# Patient Record
Sex: FEMALE | Race: WHITE
[De-identification: names, ages, dates, MRNs, and addresses within clinical notes are randomized per-mention and may not be internally consistent; named-entity substitution may affect disease eponyms.]

---

## 2021-10-12 ENCOUNTER — HOSPITAL ENCOUNTER (EMERGENCY)
Dept: HOSPITAL 63 - ER | Age: 31
Discharge: HOME | End: 2021-10-12
Payer: COMMERCIAL

## 2021-10-12 VITALS — SYSTOLIC BLOOD PRESSURE: 119 MMHG | DIASTOLIC BLOOD PRESSURE: 76 MMHG

## 2021-10-12 VITALS — HEIGHT: 62 IN | BODY MASS INDEX: 39.64 KG/M2 | WEIGHT: 215.39 LBS

## 2021-10-12 DIAGNOSIS — N30.01: Primary | ICD-10-CM

## 2021-10-12 DIAGNOSIS — Z88.1: ICD-10-CM

## 2021-10-12 LAB
ALBUMIN SERPL-MCNC: 3.3 G/DL (ref 3.4–5)
ALBUMIN/GLOB SERPL: 0.7 {RATIO} (ref 1–1.7)
ALP SERPL-CCNC: 115 U/L (ref 46–116)
ALT SERPL-CCNC: 37 U/L (ref 14–59)
ANION GAP SERPL CALC-SCNC: 7 MMOL/L (ref 6–14)
APTT PPP: (no result) S
AST SERPL-CCNC: 20 U/L (ref 15–37)
BACTERIA #/AREA URNS HPF: (no result) /HPF
BASOPHILS # BLD AUTO: 0.1 X10^3/UL (ref 0–0.2)
BASOPHILS NFR BLD: 1 % (ref 0–3)
BILIRUB SERPL-MCNC: 0.3 MG/DL (ref 0.2–1)
BILIRUB UR QL STRIP: (no result)
BUN/CREAT SERPL: 23 (ref 6–20)
CA-I SERPL ISE-MCNC: 14 MG/DL (ref 7–20)
CALCIUM SERPL-MCNC: 9.6 MG/DL (ref 8.5–10.1)
CHLORIDE SERPL-SCNC: 102 MMOL/L (ref 98–107)
CO2 SERPL-SCNC: 29 MMOL/L (ref 21–32)
CREAT SERPL-MCNC: 0.6 MG/DL (ref 0.6–1)
EOSINOPHIL NFR BLD: 0.1 X10^3/UL (ref 0–0.7)
EOSINOPHIL NFR BLD: 1 % (ref 0–3)
ERYTHROCYTE [DISTWIDTH] IN BLOOD BY AUTOMATED COUNT: 14.3 % (ref 11.5–14.5)
FIBRINOGEN PPP-MCNC: (no result) MG/DL
GFR SERPLBLD BASED ON 1.73 SQ M-ARVRAT: 115.9 ML/MIN
GLOBULIN SER-MCNC: 4.5 G/DL (ref 2.2–3.8)
GLUCOSE SERPL-MCNC: 94 MG/DL (ref 70–99)
GLUCOSE UR STRIP-MCNC: (no result) MG/DL
HCT VFR BLD CALC: 37.4 % (ref 36–47)
HGB BLD-MCNC: 12.2 G/DL (ref 12–15.5)
LYMPHOCYTES # BLD: 3 X10^3/UL (ref 1–4.8)
LYMPHOCYTES NFR BLD AUTO: 29 % (ref 24–48)
MCH RBC QN AUTO: 27 PG (ref 25–35)
MCHC RBC AUTO-ENTMCNC: 33 G/DL (ref 31–37)
MCV RBC AUTO: 81 FL (ref 79–100)
MONO #: 0.7 X10^3/UL (ref 0–1.1)
MONOCYTES NFR BLD: 6 % (ref 0–9)
NEUT #: 6.6 X10^3UL (ref 1.8–7.7)
NEUTROPHILS NFR BLD AUTO: 63 % (ref 31–73)
NITRITE UR QL STRIP: (no result)
PLATELET # BLD AUTO: 336 X10^3/UL (ref 140–400)
POTASSIUM SERPL-SCNC: 3.8 MMOL/L (ref 3.5–5.1)
PROT SERPL-MCNC: 7.8 G/DL (ref 6.4–8.2)
RBC # BLD AUTO: 4.6 X10^6/UL (ref 3.5–5.4)
RBC #/AREA URNS HPF: (no result) /HPF (ref 0–2)
SODIUM SERPL-SCNC: 138 MMOL/L (ref 136–145)
SP GR UR STRIP: >=1.03
SQUAMOUS #/AREA URNS LPF: (no result) /LPF
UROBILINOGEN UR-MCNC: 0.2 MG/DL
WBC # BLD AUTO: 10.4 X10^3/UL (ref 4–11)
WBC #/AREA URNS HPF: (no result) /HPF (ref 0–4)

## 2021-10-12 PROCEDURE — 93005 ELECTROCARDIOGRAM TRACING: CPT

## 2021-10-12 PROCEDURE — 84484 ASSAY OF TROPONIN QUANT: CPT

## 2021-10-12 PROCEDURE — 74177 CT ABD & PELVIS W/CONTRAST: CPT

## 2021-10-12 PROCEDURE — 81001 URINALYSIS AUTO W/SCOPE: CPT

## 2021-10-12 PROCEDURE — 87086 URINE CULTURE/COLONY COUNT: CPT

## 2021-10-12 PROCEDURE — 85379 FIBRIN DEGRADATION QUANT: CPT

## 2021-10-12 PROCEDURE — 85025 COMPLETE CBC W/AUTO DIFF WBC: CPT

## 2021-10-12 PROCEDURE — 80061 LIPID PANEL: CPT

## 2021-10-12 PROCEDURE — 99285 EMERGENCY DEPT VISIT HI MDM: CPT

## 2021-10-12 PROCEDURE — 80053 COMPREHEN METABOLIC PANEL: CPT

## 2021-10-12 PROCEDURE — 81025 URINE PREGNANCY TEST: CPT

## 2021-10-12 PROCEDURE — 36415 COLL VENOUS BLD VENIPUNCTURE: CPT

## 2021-10-12 NOTE — RAD
CT scan of the abdomen and pelvis with contrast  10/12/2021



CLINICAL HISTORY: Right-sided abdominal pain.



TECHNIQUE: After the intravenous administration of 75 cc of Omnipaque 300 only, contiguous, 5 mm axia
l sections were obtained through the abdomen and pelvis.



One or more of the following individualized dose reduction techniques were utilized for this study:



1. Automated exposure control.

2. Adjustment of the mA and/or kV according to patient size.

3. Use of iterative reconstruction technique.



FINDINGS: Images through the lung bases demonstrate a small area of probable subsegmental atelectasis
 involving the medial aspect of the right lower lobe.



The liver parenchyma has a decreased attenuation consistent with fatty infiltration. The spleen, panc
reas, adrenal glands and kidneys are within normal limits.



The abdominal aorta tapers normally. The gallbladder is contracted. No free fluid or free air is seen
 within the abdomen. Air and stool are seen throughout the colon. There is no evidence of bowel obstr
uction. The appendix is well-visualized and is within normal limits. There is a small fat-containing 
umbilical hernia which measures 1.8 cm in size.



Images through the pelvis demonstrate the urinary bladder distended with urine. Scattered diverticula
 are seen involving the sigmoid colon. No inflammatory changes are seen within the adjacent fat. A pu
nctate calcification is seen within the left pelvis consistent with a phlebolith. No free fluid is se
en. No adnexal mass is noted. Minimal S-shaped curvature of the thoracolumbar spine is seen.



IMPRESSION: No acute abnormality is seen.





Electronically signed by: Daniel Wild MD (10/12/2021 10:23 PM) [id number]

## 2021-10-12 NOTE — EKG
[facility] [address], [place] [address]

Test Date:    2021-10-12               Test Time:    20:45:49

Pat Name:     NICOLE GOOD           Department:   

Patient ID:   [id number]           Room:          

Gender:       F                        Technician:   

:          [id number]               Requested By: BAN CUELLAR

Order Number: [id number]            Reading MD:   Ketan Watkins MD

                                 Measurements

Intervals                              Axis          

Rate:         78                       P:            39

AK:           130                      QRS:          34

QRSD:         80                       T:            7

QT:           372                                    

QTc:          428                                    

                           Interpretive Statements

SINUS RHYTHM

Electronically Signed On 10- 11:49:34 CDT by Ketan Watkins MD

## 2021-10-12 NOTE — PHYS DOC
Past History


Past Medical History:  Other


 (BAN CUELLAR)


Past Surgical History:  No Surgical History


 (BAN CUELLAR)


Smoking:  Non-smoker


Alcohol Use:  None


Drug Use:  None


 (BAN CUELLAR)





General Adult


EDM:


Chief Complaint:  GI PROBLEM





HPI:


HPI:


Patient is a 31-year-old female who presents to the emergency department for 

right upper and lower quadrant pain x5 days.  Pain does not radiate.  He has 0 

out of 10 at rest.  8 out of 10 with movement.  No treatment prior to arrival.  

Patient reports that at times the pain occurs in her right shoulder.  She denies

nausea, vomiting, diarrhea, injury, dysuria, hematuria, urinary frequency ur

gency, blood in urine, shortness of breath, chest pain, leg swelling.  Patient 

is concerned because she has a history of PEs and she is worried that she has 

aPE.


 (BAN CUELLAR)





Review of Systems:


Review of Systems:


14 body systems of the review of systems have been reviewed.  See HPI for 

pertinent positive and negative responses, otherwise all other systems are 

negative, nonpertinent or noncontributory


 (BAN CUELLAR)





Allergies:


Allergies:





Allergies








Coded Allergies Type Severity Reaction Last Updated Verified


 


  Amoxicillin Allergy Intermediate hives 5/14/14 Yes


 


Uncoded Allergies Type Severity Reaction Last Updated Verified


 


  LACTOSE INTOLERANCE Allergy Unknown  5/14/14 








 (BAN CUELLAR)





Physical Exam:


PE:





Constitutional: Well developed, well nourished, no acute distress, non-toxic 

appearance. []


HENT: Normocephalic, atraumatic, bilateral external ears normal, oropharynx 

moist, no oral exudates, nose normal. []


Eyes: PERRL, EOMI, conjunctiva normal, no discharge. [] 


Neck: Normal range of motion, no stridor


Cardiovascular:Heart rate regular rhythm, no murmur []


Lungs & Thorax:  Bilateral breath sounds clear to auscultation []


Abdomen: Bowel sounds normal, soft, right upper and lower quadrant pain with 

palpation, no masses, no pulsatile masses. [] 


Skin: Warm, dry, no erythema, no rash. [] 


Back: No tenderness, no CVA tenderness. [] 


Extremities: No tenderness, no cyanosis, no clubbing, ROM intact, no edema. [] 


Neurologic: Alert and oriented X 3, normal motor function, normal sensory 

function, no focal deficits noted. []


Psychologic: Affect normal, judgement normal, mood normal. []


 (BAN CUELLAR)





Current Patient Data:


Labs:





Laboratory Tests








Test


 10/12/21


20:45 10/12/21


20:55 10/12/21


20:59 10/12/21


21:41


 


Urine Collection Type Unknown    


 


Urine Color Red    


 


Urine Clarity Cloudy    


 


Urine pH 6.5    


 


Urine Specific Gravity >=1.030    


 


Urine Protein 30 mg/dl    


 


Urine Glucose (UA) Neg mg/dL    


 


Urine Ketones (Stick) Neg mg/dL    


 


Urine Blood Large    


 


Urine Nitrite Neg    


 


Urine Bilirubin Neg    


 


Urine Urobilinogen Dipstick 0.2 mg/dL    


 


Urine Leukocyte Esterase Trace    


 


Urine RBC Tntc /HPF    


 


Urine WBC Tntc /HPF    


 


Urine Squamous Epithelial


Cells Few /LPF 


 


 


 





 


Urine Bacteria Few /HPF    


 


White Blood Count  10.4 x10^3/uL   


 


Red Blood Count  4.60 x10^6/uL   


 


Hemoglobin  12.2 g/dL   


 


Hematocrit  37.4 %   


 


Mean Corpuscular Volume  81 fL   


 


Mean Corpuscular Hemoglobin  27 pg   


 


Mean Corpuscular Hemoglobin


Concent 


 33 g/dL 


 


 





 


Red Cell Distribution Width  14.3 %   


 


Platelet Count  336 x10^3/uL   


 


Neutrophils (%) (Auto)  63 %   


 


Lymphocytes (%) (Auto)  29 %   


 


Monocytes (%) (Auto)  6 %   


 


Eosinophils (%) (Auto)  1 %   


 


Basophils (%) (Auto)  1 %   


 


Neutrophils # (Auto)  6.6 x10^3uL   


 


Lymphocytes # (Auto)  3.0 x10^3/uL   


 


Monocytes # (Auto)  0.7 x10^3/uL   


 


Eosinophils # (Auto)  0.1 x10^3/uL   


 


Basophils # (Auto)  0.1 x10^3/uL   


 


Troponin I Quantitative  < 0.017 ng/mL   


 


Bedside Urine HCG, Qualitative   hcg negative  


 


D-Dimer (Melisa)    0.46 mg/L 


 


Sodium Level    138 mmol/L 


 


Potassium Level    3.8 mmol/L 


 


Chloride Level    102 mmol/L 


 


Carbon Dioxide Level    29 mmol/L 


 


Anion Gap    7 


 


Blood Urea Nitrogen    14 mg/dL 


 


Creatinine    0.6 mg/dL 


 


Estimated GFR


(Cockcroft-Gault) 


 


 


 115.9 





 


BUN/Creatinine Ratio    23 


 


Glucose Level    94 mg/dL 


 


Calcium Level    9.6 mg/dL 


 


Total Bilirubin    0.3 mg/dL 


 


Aspartate Amino Transf


(AST/SGOT) 


 


 


 20 U/L 





 


Alanine Aminotransferase


(ALT/SGPT) 


 


 


 37 U/L 





 


Alkaline Phosphatase    115 U/L 


 


Total Protein    7.8 g/dL 


 


Albumin    3.3 g/dL 


 


Albumin/Globulin Ratio    0.7 








Current Medications








 Medications


  (Trade)  Dose


 Ordered  Sig/Rosie


 Route


 PRN Reason  Start Time


 Stop Time Status Last Admin


Dose Admin


 


 Iohexol


  (Omnipaque 300


 Mg/ml)  75 ml  1X  ONCE


 IV


   10/12/21 20:45


 10/12/21 20:48 DC 10/12/21 20:45











 (BAN CUELLAR)





EKG:


EKG:


EKG performed by ER staff at 2045 shows sinus rhythm, heart rate of 78, no STEMI

 read by Dr. Tejada at 2050 []


 (BAN CUELLAR)





Radiology/Procedures:


Radiology/Procedures:


[]PROCEDURE: CT ABD PELV W/ IV CONTRST ONLY





CT scan of the abdomen and pelvis with contrast  10/12/2021





CLINICAL HISTORY: Right-sided abdominal pain.





TECHNIQUE: After the intravenous administration of 75 cc of Omnipaque 300 only, 

contiguous, 5 mm axial sections were obtained through the abdomen and pelvis.





One or more of the following individualized dose reduction techniques were 

utilized for this study:





1. Automated exposure control.


2. Adjustment of the mA and/or kV according to patient size.


3. Use of iterative reconstruction technique.





FINDINGS: Images through the lung bases demonstrate a small area of probable 

subsegmental atelectasis involving the medial aspect of the right lower lobe.





The liver parenchyma has a decreased attenuation consistent with fatty 

infiltration. The spleen, pancreas, adrenal glands and kidneys are within normal

 limits.





The abdominal aorta tapers normally. The gallbladder is contracted. No free 

fluid or free air is seen within the abdomen. Air and stool are seen throughout 

the colon. There is no evidence of bowel obstruction. The appendix is well-

visualized and is within normal limits. There is a small fat-containing 

umbilical hernia which measures 1.8 cm in size.





Images through the pelvis demonstrate the urinary bladder distended with urine. 

Scattered diverticula are seen involving the sigmoid colon. No inflammatory 

changes are seen within the adjacent fat. A punctate calcification is seen 

within the left pelvis consistent with a phlebolith. No free fluid is seen. No 

adnexal mass is noted. Minimal S-shaped curvature of the thoracolumbar spine is 

seen.





IMPRESSION: No acute abnormality is seen.








Electronically signed by: Daniel Wild MD (10/12/2021 10:23 PM) [id number]














DICTATED AND SIGNED BY:     DANIEL WILD MD


DATE:     10/12/21 2206





CC: EMERGENCY,DEPARTMENT; BAN CUELLAR; NORMA CASTRO PAC ~MTH0 0


 (BAN CUELLAR)





Heart Score:


C/O Chest Pain:  No


Risk Factors:


Risk Factors:  DM, Current or recent (<one month) smoker, HTN, HLP, family 

history of CAD, obesity.


Risk Scores:


Score 0 - 3:  2.5% MACE over next 6 weeks - Discharge Home


Score 4 - 6:  20.3% MACE over next 6 weeks - Admit for Clinical Observation


Score 7 - 10:  72.7% MACE over next 6 weeks - Early Invasive Strategies


 (BAN CUELLAR)





Course & Med Decision Making:


Course & Med Decision Making


Pertinent Labs and Imaging studies reviewed. (See chart for details)





[] Patient presents to the emergency department for right upper and lower 

abdominal pain, right shoulder pain.  Work-up in the ER consisted of urinalysis,

 blood work, EKG, CT scan of abdomen and pelvis.  Right shoulder pain is most 

likely musculoskeletal as it is painful with movement and reproducible with 

movement of shoulder joint, no injury reported.  Patient has full range of 

motion and neuro intact to right upper extremity.  CT scan of abdomen was 

negative for any acute findings.  Blood work is unremarkable.  Patient advised 

to increase fluids, avoid bladder irritants.  She was discharged home with an 

antibiotic to treat her urinary tract infection.  I discussed with patient all 

findings and diagnostic testing as well as the need to follow-up with PCP for 

further evaluation and treatment or return to the ER if any new or worsening 

symptoms.  Strict return precautions were also discussed at length.  Patient 

voiced understanding and agreement with the plan.  Patient is hemodynamically 

stable at the time of disposition.


 (BAN CUELLAR)





Dragon Disclaimer:


Dragon Disclaimer:


This electronic medical record was generated, in whole or in part, using a voice

 recognition dictation system.


 (BAN CUELLAR)





Departure


Departure:


Impression:  


   Primary Impression:  


   Urinary tract infection


   Qualified Codes:  N30.01 - Acute cystitis with hematuria


Disposition:  01 HOME / SELF CARE / HOMELESS


Condition:  GOOD


Referrals:  


NORMA CASTRO PAC (PCP)


Patient Instructions:  Urinary Tract Infection





Additional Instructions:  


You were seen in the emergency department for abdominal pain.  Your work-up in 

the ER was unremarkable except for a urinary tract infection.  This is treated 

with an antibiotic.  Please start and finish the antibiotic completely.  You 

were given your first dose in the ER.  Increase your fluids.  Avoid bladder 

irritants which includes caffeine, sugary beverages and alcohol.  You can take 

Tylenol or ibuprofen for your pain.  Follow-up with your primary care provider 

tomorrow regarding your ER visit.  Return to the emergency department if you 

develop worsening of your abdominal pain, intractable nausea or vomiting, high 

fevers refractory to treatment, blood in your stools or vomit or any new or 

worsening concerns.





EMERGENCY DEPARTMENT GENERAL DISCHARGE INSTRUCTIONS





Thank you for coming to [facility] (ED) today and trusting us

 with you 


care.  We trust that you had a positivie experience in our Emergency Department.

  If you 


wish to speak to the department management, you may call the director at 

[phone].





YOUR FOLLOW UP INSTRUCTIONS ARE AS FOLLOWS:





1.  Do you have a private Doctor?  If you do not have a private doctor, please 

ask for a 


resource list of physicians or clinics that may be able to assist you with 

follow up care.





2.  The Emergency Physician has interpreted your x-rays.  The X-Ray specialist 

will also 


review them.  If there is a change in the findings, you will be notified in 48 

hours when at 


all possible.





3.  A lab test or culture has been done, your results will be reviewed and you w

ill be 


notified if you need a change in treatment.





ADDITIONAL INSTRUCTIONS AND INFORMATION:





1.  Your care today has been supervised by a physician who is specially trained 

in emergency 


care.  Many problems require more than one evaluation for a complete diagnosis 

and 


treatment.  We recommend that you schedule your follow up appointment as 

recommended to 


ensure complete treatment of you illness or injury.  If you are unable to obtain

 follow up 


care and continue to have a problem, or if your condition worsens, we recommend 

that you 


return to the ED.





2.  We are not able to safely determine your condition over the phone nor are we

 able to 


give sound medical advice over the phone.  For these safety reasons, if you call

 for medical 


advice we will ask you to come to the ED for further evaluation.





3.  If you have any questions regarding these discharge instructions please call

 the ED at 


[phone].





SAFETY INFORMATION:





In the interest of safety, wellness, and injury prevention; we encourage you to 

wear your 


sealbelt, if you smoke; quite smoking, and we encourage family to use a 

protective helmet 


for bicycling and other sporting events that present an increased risk for head 

injury.





IF YOUR SYMPTOMS WORSEN OR NEW SYMPTOMS DEVELOP, OR YOU HAVE CONCERNS ABOUT YOUR

 CONDITION; 


OR IF YOUR CONDITION WORSENS WHILE YOU ARE WAITING FOR YOUR FOLLOW UP 

APPOINTMENT; EITHER 


CONTACT YOUR PRIMARY CARE DOCTOR, THE PHYSICIAN WHOSE NAME AND NUMBER YOU WERE 

GIVEN, OR 


RETURN TO THE ED IMMEDIATELY.


Scripts


Nitrofurantoin Monohyd/M-Cryst (MACROBID 100 MG CAPSULE) 100 Mg Capsule


100 CAP PO BID for UTI for 5 Days, #10 CAP 0 Refills


   Prov: BAN CUELLAR         10/12/21





Attending Signature


Attending Signature


I have reviewed the PA/NP's note and plan of care. I was available for 

consultation as needed during the patient's visit in the emergency department. I

 agree with the clinical impression, plan, and disposition.


 (TREY TEJADA DO)











BAN CUELLAR          Oct 12, 2021 20:35


TREY TEJADA DO             Oct 13, 2021 01:44

## 2021-10-14 LAB
CHOLEST/HDLC SERPL: 4 {RATIO}
HDLC SERPL-MCNC: 37 MG/DL (ref 40–60)
LDLC: 102 MG/DL (ref 0–100)
TRIGL SERPL-MCNC: 74 MG/DL (ref 0–150)
VLDLC: 14 MG/DL (ref 0–40)